# Patient Record
Sex: FEMALE | Race: BLACK OR AFRICAN AMERICAN | Employment: UNEMPLOYED | URBAN - METROPOLITAN AREA
[De-identification: names, ages, dates, MRNs, and addresses within clinical notes are randomized per-mention and may not be internally consistent; named-entity substitution may affect disease eponyms.]

---

## 2018-07-19 ENCOUNTER — HOSPITAL ENCOUNTER (EMERGENCY)
Age: 23
Discharge: HOME | End: 2018-07-19
Attending: EMERGENCY MEDICINE

## 2018-07-19 VITALS
TEMPERATURE: 98.1 F | SYSTOLIC BLOOD PRESSURE: 129 MMHG | WEIGHT: 142 LBS | HEIGHT: 64 IN | BODY MASS INDEX: 24.24 KG/M2 | DIASTOLIC BLOOD PRESSURE: 73 MMHG | RESPIRATION RATE: 16 BRPM | OXYGEN SATURATION: 100 % | HEART RATE: 89 BPM

## 2018-07-19 DIAGNOSIS — N93.8 DUB (DYSFUNCTIONAL UTERINE BLEEDING): Primary | ICD-10-CM

## 2018-07-19 LAB
BACTERIA: ABNORMAL /HPF
BILIRUBIN URINE: NEGATIVE
BLOOD, URINE: ABNORMAL
CLARITY: CLEAR
COLOR: YELLOW
EPITHELIAL CELLS, UA: ABNORMAL /HPF
GLUCOSE URINE: NEGATIVE MG/DL
HCG(URINE) PREGNANCY TEST: NEGATIVE
KETONES, URINE: NEGATIVE MG/DL
LEUKOCYTE ESTERASE, URINE: NEGATIVE
MICROSCOPIC EXAMINATION: YES
MUCUS: ABNORMAL /LPF
NITRITE, URINE: NEGATIVE
PH UA: 6
PROTEIN UA: ABNORMAL MG/DL
RBC UA: >100 /HPF (ref 0–2)
SPECIFIC GRAVITY UA: >=1.03
URINE TYPE: ABNORMAL
UROBILINOGEN, URINE: 0.2 E.U./DL
WBC UA: ABNORMAL /HPF (ref 0–5)

## 2018-07-19 PROCEDURE — 99283 EMERGENCY DEPT VISIT LOW MDM: CPT

## 2018-07-19 PROCEDURE — 84703 CHORIONIC GONADOTROPIN ASSAY: CPT

## 2018-07-19 PROCEDURE — 81001 URINALYSIS AUTO W/SCOPE: CPT

## 2018-07-19 NOTE — ED PROVIDER NOTES
CHIEF COMPLAINT  Vaginal Bleeding (vag spotting off and on for two weeks denies pain)      HISTORY OF PRESENT ILLNESS  Atilio Rodriguez  is a 24 y.o. female who presents to the ED at via private vehicle complaining of vaginal spotting last 2 weeks. Patient states that she has some spotting over the course last 2 weeks. She does not had a normal menstrual cycle in a while. She is openly get a and states that they are not using any instrumentation of late because of the bleeding. She denies any fever or chills associated with this. She denies any abdominal pain associated with this. She denies any lightheadedness or dizziness. She has not seen a gynecologist in some time and has not had a Pap smear in some time. She has no symptoms other than the spotting. She has no dysuria frequency or urgency. She denies any vaginal discharge. There are no other complaints, modifying factors or associated symptoms. Nursing notes reviewed. Past medical history:  has a past medical history of Asthma. Past surgical history:  has no past surgical history on file. Home medications:   Prior to Admission medications    Medication Sig Start Date End Date Taking? Authorizing Provider   acetaminophen (APAP EXTRA STRENGTH) 500 MG tablet Take 1 tablet by mouth every 6 hours as needed for Pain 8/30/16  Yes Zander Branch MD       Allergies   Allergen Reactions    Cat Hair Extract     Citrus        Social history:  reports that she has never smoked. She has never used smokeless tobacco. She reports that she drinks alcohol. She reports that she does not use drugs. Family history:  History reviewed. No pertinent family history.     REVIEW OF SYSTEMS  6 systems reviewed, pertinent positives per HPI otherwise noted to be negative    PHYSICAL EXAM  Vitals:    07/19/18 0939   BP: 129/73   Pulse: 89   Resp: 16   Temp: 98.1 °F (36.7 °C)   SpO2: 100%       GENERAL: Patient is well-developed, well-nourished,  no acute on file         Disposition  Pt is discharged in stable condition.     Disposition Vitals:  /73   Pulse 89   Temp 98.1 °F (36.7 °C)   Resp 16   Ht 5' 4\" (1.626 m)   Wt 64.4 kg (142 lb)   LMP 06/10/2018   SpO2 100%   BMI 24.37 kg/m²           Lyn Lemon,   07/19/18 1013

## 2024-09-01 ENCOUNTER — HOSPITAL ENCOUNTER (EMERGENCY)
Age: 29
Discharge: HOME | End: 2024-09-01
Attending: EMERGENCY MEDICINE

## 2024-09-01 VITALS
BODY MASS INDEX: 32.92 KG/M2 | TEMPERATURE: 99.3 F | DIASTOLIC BLOOD PRESSURE: 76 MMHG | OXYGEN SATURATION: 98 % | HEART RATE: 78 BPM | RESPIRATION RATE: 12 BRPM | SYSTOLIC BLOOD PRESSURE: 128 MMHG | WEIGHT: 191.8 LBS

## 2024-09-01 DIAGNOSIS — S00.551A FOREIGN BODY IN LIP, INITIAL ENCOUNTER: Primary | ICD-10-CM

## 2024-09-01 PROCEDURE — 10120 INC&RMVL FB SUBQ TISS SMPL: CPT

## 2024-09-01 PROCEDURE — 99282 EMERGENCY DEPT VISIT SF MDM: CPT

## 2024-09-01 ASSESSMENT — PAIN - FUNCTIONAL ASSESSMENT: PAIN_FUNCTIONAL_ASSESSMENT: NONE - DENIES PAIN

## 2024-09-01 NOTE — ED PROVIDER NOTES
[facility] Emergency Department      Pt Name: Shruthi Pérez  MRN: [id number]  Birthdate 1995  Date of evaluation: 9/1/2024  Provider: ANTOINETTE BROCK MD  CHIEF COMPLAINT  Chief Complaint   Patient presents with    Foreign Body     Piercing stuck in lip  past 10 min      HPI  Shruthi Pérez is a 29 y.o. female who presents because of piercing stuck in her lip.  She has a gap between her teeth and the back of the piercing of her upper lip got caught between her teeth.  This caused the piercing to pull such that the ball portion of it is stuck in her lip.  She tried to reposition it but was unsuccessful.  Injury happened about 10 minutes prior to arrival    REVIEW OF SYSTEMS:  See HPI for further details. Remainder of pertinent ROS reviewed and negative.  Nursing notes reviewed.    PAST MEDICAL HISTORY  Past Medical History:   Diagnosis Date    Asthma      SURGICAL HISTORY  History reviewed. No pertinent surgical history.  MEDICATIONS:  No current facility-administered medications on file prior to encounter.     Current Outpatient Medications on File Prior to Encounter   Medication Sig Dispense Refill    acetaminophen (APAP EXTRA STRENGTH) 500 MG tablet Take 1 tablet by mouth every 6 hours as needed for Pain 40 tablet 0     ALLERGIES  Cat hair extract and Citrus  SOCIAL HISTORY:  Social History     Tobacco Use    Smoking status: Never    Smokeless tobacco: Never   Substance Use Topics    Alcohol use: Not Currently     Comment: occ    Drug use: No     IMMUNIZATIONS:    There is no immunization history on file for this patient.    PHYSICAL EXAM  VITAL SIGNS:  Blood pressure 128/76, pulse 78, temperature 99.3 °F (37.4 °C), temperature source Oral, resp. rate 12, weight 87 kg (191 lb 12.8 oz), last menstrual period 09/01/2024, SpO2 98%.  Constitutional:  29 y.o. female who does not appear toxic or acutely ill  HENT:  Atraumatic, mucous membranes moist, the back part of the piercing is visible to the upper lip and the

## 2025-03-15 ENCOUNTER — HOSPITAL ENCOUNTER (EMERGENCY)
Age: 30
Discharge: HOME | End: 2025-03-15
Attending: EMERGENCY MEDICINE
Payer: COMMERCIAL

## 2025-03-15 VITALS
WEIGHT: 196.8 LBS | HEIGHT: 64 IN | OXYGEN SATURATION: 100 % | RESPIRATION RATE: 14 BRPM | SYSTOLIC BLOOD PRESSURE: 119 MMHG | BODY MASS INDEX: 33.6 KG/M2 | TEMPERATURE: 97.5 F | HEART RATE: 95 BPM | DIASTOLIC BLOOD PRESSURE: 76 MMHG

## 2025-03-15 DIAGNOSIS — N93.8 DYSFUNCTIONAL UTERINE BLEEDING: Primary | ICD-10-CM

## 2025-03-15 LAB
BASOPHILS # BLD: 0.1 K/UL (ref 0–0.2)
BASOPHILS NFR BLD: 0.7 %
DEPRECATED RDW RBC AUTO: 14.9 % (ref 12.4–15.4)
EOSINOPHIL # BLD: 0.7 K/UL (ref 0–0.6)
EOSINOPHIL NFR BLD: 8.7 %
HCG SERPL QL: NEGATIVE
HCT VFR BLD AUTO: 34.6 % (ref 36–48)
HGB BLD-MCNC: 11.2 G/DL (ref 12–16)
LYMPHOCYTES # BLD: 3.3 K/UL (ref 1–5.1)
LYMPHOCYTES NFR BLD: 41.7 %
MCH RBC QN AUTO: 27.6 PG (ref 26–34)
MCHC RBC AUTO-ENTMCNC: 32.3 G/DL (ref 31–36)
MCV RBC AUTO: 85.3 FL (ref 80–100)
MONOCYTES # BLD: 0.3 K/UL (ref 0–1.3)
MONOCYTES NFR BLD: 3.4 %
NEUTROPHILS # BLD: 3.6 K/UL (ref 1.7–7.7)
NEUTROPHILS NFR BLD: 45.5 %
PLATELET # BLD AUTO: 349 K/UL (ref 135–450)
PMV BLD AUTO: 7.5 FL (ref 5–10.5)
RBC # BLD AUTO: 4.06 M/UL (ref 4–5.2)
WBC # BLD AUTO: 7.9 K/UL (ref 4–11)

## 2025-03-15 PROCEDURE — 85025 COMPLETE CBC W/AUTO DIFF WBC: CPT

## 2025-03-15 PROCEDURE — 84703 CHORIONIC GONADOTROPIN ASSAY: CPT

## 2025-03-15 PROCEDURE — 99283 EMERGENCY DEPT VISIT LOW MDM: CPT

## 2025-03-15 ASSESSMENT — PAIN - FUNCTIONAL ASSESSMENT: PAIN_FUNCTIONAL_ASSESSMENT: NONE - DENIES PAIN

## 2025-03-15 NOTE — ED PROVIDER NOTES
[facility] EMERGENCY DEPARTMENT    CHIEF COMPLAINT  Vaginal Bleeding (Pt presents with being on her menstrual cycle for 15 days. She is experiencing some clots. Has been seen in the past for same issue and was told it was her uterine lining shedding. No pain )       HISTORY OF PRESENT ILLNESS  Shruthi Pérez is a 29 y.o. female who presents to the ED with complaint of prolonged menstrual cycle. Reports has been bleeding for 15 days. Had a previous episode of same that happened several years ago. She's noticing some blood clots as well. Denies abdominal pain. Denies fatigue or lightheadedness. Denies fever, chest pain, SOB, nausea, vomiting, diarrhea, dysuria.     I have reviewed the following from the nursing documentation:    Past Medical History:   Diagnosis Date    Asthma      No past surgical history on file.  No family history on file.  Social History     Socioeconomic History    Marital status: Single     Spouse name: Not on file    Number of children: Not on file    Years of education: Not on file    Highest education level: Not on file   Occupational History    Not on file   Tobacco Use    Smoking status: Never    Smokeless tobacco: Never   Substance and Sexual Activity    Alcohol use: Not Currently     Comment: occ    Drug use: No    Sexual activity: Not on file   Other Topics Concern    Not on file   Social History Narrative    Not on file     Social Drivers of Health     Financial Resource Strain: Not on file   Food Insecurity: Not on file   Transportation Needs: Not on file   Physical Activity: Not on file   Stress: Not on file   Social Connections: Not on file   Intimate Partner Violence: Not on file   Housing Stability: Not on file     No current facility-administered medications for this encounter.     Current Outpatient Medications   Medication Sig Dispense Refill    acetaminophen (APAP EXTRA STRENGTH) 500 MG tablet Take 1 tablet by mouth every 6 hours as needed for Pain (Patient not taking:

## 2025-03-15 NOTE — DISCHARGE INSTRUCTIONS
Dear Shruthi Pérez,     Thank you for the privilege of caring for you today in the Emergency Department.     Take ibuprofen, 400 mg three times daily throughout your menstrual cycle, to reduce blood loss.     Follow with Gynecology at the number provided.     Regards,     Dylan Grant MD, FACEP